# Patient Record
Sex: MALE | Race: OTHER
[De-identification: names, ages, dates, MRNs, and addresses within clinical notes are randomized per-mention and may not be internally consistent; named-entity substitution may affect disease eponyms.]

---

## 2019-03-26 ENCOUNTER — HOSPITAL ENCOUNTER (EMERGENCY)
Dept: HOSPITAL 89 - ER | Age: 39
Discharge: HOME | End: 2019-03-26
Payer: COMMERCIAL

## 2019-03-26 VITALS — DIASTOLIC BLOOD PRESSURE: 79 MMHG | SYSTOLIC BLOOD PRESSURE: 112 MMHG

## 2019-03-26 DIAGNOSIS — S30.1XXA: Primary | ICD-10-CM

## 2019-03-26 LAB — PLATELET COUNT, AUTOMATED: 208 K/UL (ref 150–450)

## 2019-03-26 PROCEDURE — 84155 ASSAY OF PROTEIN SERUM: CPT

## 2019-03-26 PROCEDURE — 84520 ASSAY OF UREA NITROGEN: CPT

## 2019-03-26 PROCEDURE — 82435 ASSAY OF BLOOD CHLORIDE: CPT

## 2019-03-26 PROCEDURE — 81001 URINALYSIS AUTO W/SCOPE: CPT

## 2019-03-26 PROCEDURE — 96374 THER/PROPH/DIAG INJ IV PUSH: CPT

## 2019-03-26 PROCEDURE — 84450 TRANSFERASE (AST) (SGOT): CPT

## 2019-03-26 PROCEDURE — 99284 EMERGENCY DEPT VISIT MOD MDM: CPT

## 2019-03-26 PROCEDURE — 85651 RBC SED RATE NONAUTOMATED: CPT

## 2019-03-26 PROCEDURE — 85025 COMPLETE CBC W/AUTO DIFF WBC: CPT

## 2019-03-26 PROCEDURE — 84295 ASSAY OF SERUM SODIUM: CPT

## 2019-03-26 PROCEDURE — 84075 ASSAY ALKALINE PHOSPHATASE: CPT

## 2019-03-26 PROCEDURE — 84460 ALANINE AMINO (ALT) (SGPT): CPT

## 2019-03-26 PROCEDURE — 82374 ASSAY BLOOD CARBON DIOXIDE: CPT

## 2019-03-26 PROCEDURE — 74177 CT ABD & PELVIS W/CONTRAST: CPT

## 2019-03-26 PROCEDURE — 82565 ASSAY OF CREATININE: CPT

## 2019-03-26 PROCEDURE — 82247 BILIRUBIN TOTAL: CPT

## 2019-03-26 PROCEDURE — 82947 ASSAY GLUCOSE BLOOD QUANT: CPT

## 2019-03-26 PROCEDURE — 82310 ASSAY OF CALCIUM: CPT

## 2019-03-26 PROCEDURE — 82040 ASSAY OF SERUM ALBUMIN: CPT

## 2019-03-26 PROCEDURE — 96361 HYDRATE IV INFUSION ADD-ON: CPT

## 2019-03-26 PROCEDURE — 86140 C-REACTIVE PROTEIN: CPT

## 2019-03-26 PROCEDURE — 84132 ASSAY OF SERUM POTASSIUM: CPT

## 2019-03-26 PROCEDURE — 83690 ASSAY OF LIPASE: CPT

## 2019-03-26 NOTE — RADIOLOGY IMAGING REPORT
FACILITY: [facility] 

 

PATIENT NAME: Man Loo

: 1980

MR: [id number]

V: [id number]

EXAM DATE: 

ORDERING PHYSICIAN: VIOLET MCCLELLAN

TECHNOLOGIST: 

 

Location: [facility] [place]

Patient: Man Loo

: 1980

MRN: [id number]

Visit/Account:[id number]

Date of Sevice:  3/26/2019

 

ACCESSION #: [id number]

 

EXAMINATION: CT abdomen and pelvis with IV contrast

 

HISTORY:  Left-sided abdominal pain.

 

TECHNIQUE:   Axial CT images of the abdomen and pelvis were obtained with IV contrast, with coronal a
nd sagittal 2D reconstructed images.

One of the following dose optimization techniques was utilized in the performance of this exam: Autom
ated exposure control; adjustment of the mA and/or kV according to the patient's size; or use of an i
terative  reconstruction technique.  Specific details can be referenced in the facility's radiology C
T exam operational policy.

 

Contrast:  75 mL of IV Isovue-370.

 

COMPARISON:  None.

 

FINDINGS:

Liver:  Negative.

 

Gallbladder and bile ducts:  Negative.

 

Spleen:  Negative.

 

Pancreas:  Negative.

 

Adrenal glands:  Negative.

 

Kidneys:  Negative. No hydronephrosis or urinary calculi.

 

Bowel and peritoneum:  The small bowel and colon are normal in caliber, without evidence of obstructi
on or any focal inflammatory process. Normal appendix. No free fluid or free intraperitoneal air.

 

Pelvic  structures:  Negative.

 

Lymph node assessment:  Negative.

 

Vessels:  Negative.

 

Musculoskeletal:   Negative.

 

Body wall:  Negative.

 

Lung bases:  Negative.

 

IMPRESSION:

No CT evidence of acute intra-abdominal pathology. No specific source of abdominal pain is identified
.

 

 

Report Dictated By: Denton Martin MD at 3/26/2019 6:51 PM

 

Report E-Signed By: Denton Martin MD  at 3/26/2019 6:56 PM

 

WSN:[id number]
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
